# Patient Record
Sex: MALE | Race: WHITE | NOT HISPANIC OR LATINO | Employment: FULL TIME | URBAN - METROPOLITAN AREA
[De-identification: names, ages, dates, MRNs, and addresses within clinical notes are randomized per-mention and may not be internally consistent; named-entity substitution may affect disease eponyms.]

---

## 2019-07-23 ENCOUNTER — HOSPITAL ENCOUNTER (EMERGENCY)
Facility: HOSPITAL | Age: 34
Discharge: HOME | End: 2019-07-24
Attending: EMERGENCY MEDICINE

## 2019-07-23 VITALS
OXYGEN SATURATION: 100 % | SYSTOLIC BLOOD PRESSURE: 171 MMHG | RESPIRATION RATE: 18 BRPM | DIASTOLIC BLOOD PRESSURE: 104 MMHG | HEART RATE: 72 BPM | TEMPERATURE: 97.3 F

## 2019-07-23 DIAGNOSIS — K04.7 DENTAL ABSCESS: Primary | ICD-10-CM

## 2019-07-23 PROCEDURE — 99282 EMERGENCY DEPT VISIT SF MDM: CPT

## 2019-07-24 RX ORDER — OXYCODONE HYDROCHLORIDE AND ACETAMINOPHEN 5; 325 MG/1; MG/1
TABLET ORAL
Qty: 10 TABLET | Refills: 0 | Status: SHIPPED | OUTPATIENT
Start: 2019-07-24

## 2019-07-24 RX ORDER — CLINDAMYCIN HYDROCHLORIDE 300 MG/1
300 CAPSULE ORAL EVERY 8 HOURS SCHEDULED
Qty: 21 CAPSULE | Refills: 0 | Status: SHIPPED | OUTPATIENT
Start: 2019-07-24 | End: 2019-07-31

## 2019-07-24 RX ORDER — BUPIVACAINE HYDROCHLORIDE 5 MG/ML
5 INJECTION, SOLUTION EPIDURAL; INTRACAUDAL ONCE
Status: COMPLETED | OUTPATIENT
Start: 2019-07-24 | End: 2019-07-24

## 2019-07-24 RX ORDER — OXYCODONE HYDROCHLORIDE AND ACETAMINOPHEN 5; 325 MG/1; MG/1
2 TABLET ORAL ONCE
Status: COMPLETED | OUTPATIENT
Start: 2019-07-24 | End: 2019-07-24

## 2019-07-24 RX ORDER — CLINDAMYCIN HYDROCHLORIDE 150 MG/1
300 CAPSULE ORAL ONCE
Status: COMPLETED | OUTPATIENT
Start: 2019-07-24 | End: 2019-07-24

## 2019-07-24 RX ADMIN — CLINDAMYCIN HYDROCHLORIDE 300 MG: 150 CAPSULE ORAL at 00:30

## 2019-07-24 RX ADMIN — OXYCODONE HYDROCHLORIDE AND ACETAMINOPHEN 2 TABLET: 5; 325 TABLET ORAL at 00:30

## 2019-07-24 RX ADMIN — BUPIVACAINE HYDROCHLORIDE 5 ML: 5 INJECTION, SOLUTION EPIDURAL; INTRACAUDAL; PERINEURAL at 00:12

## 2019-07-24 NOTE — ED PROVIDER NOTES
History  Chief Complaint   Patient presents with    Dental Pain     Patient states he was seen at [facility] for broken tooth, given antibiotics and referred to a clinic  Patient states he has not been able to get into clinic, went to [facility] and waited for an hour in the waiting room so came here instead  Patients insurance does not start until next week so he cannot get into dentist      34 yo male broke left lower tooth a few months ago  Initially ok but then pain worse so went to [facility] 3 weeks ago and given course of Keflex  He was given number for clinic to follow up with and he says he called multiple times but they never called him back  He was trying to wait until insurance kicked in next week but today pain really severe  Now with associated left facial swelling  No fever  No relief with Advil  None       History reviewed  No pertinent past medical history  History reviewed  No pertinent surgical history  History reviewed  No pertinent family history  I have reviewed and agree with the history as documented  Social History     Tobacco Use    Smoking status: Never Smoker   Substance Use Topics    Alcohol use: Yes    Drug use: Not Currently        Review of Systems    Physical Exam  Physical Exam   Constitutional: He is oriented to person, place, and time  He appears well-developed and well-nourished  He appears distressed  HENT:   Left lower molars broken and decayed with gum swelling and periapical abscess  + mild left cheek swelling  Eyes: No scleral icterus  Neck: Neck supple  Pulmonary/Chest: Effort normal    Musculoskeletal: Normal range of motion  Neurological: He is alert and oriented to person, place, and time  Skin: Skin is warm and dry  He is not diaphoretic  Psychiatric: He has a normal mood and affect  His behavior is normal    Nursing note and vitals reviewed        Vital Signs  ED Triage Vitals [07/23/19 2340]   Temperature Pulse Respirations Blood Pressure SpO2   (!) 97 3 °F (36 3 °C) 72 18 (!) 171/104 100 %      Temp Source Heart Rate Source Patient Position - Orthostatic VS BP Location FiO2 (%)   Tympanic Monitor -- Right arm --      Pain Score       --           Vitals:    07/23/19 2340   BP: (!) 171/104   Pulse: 72         Visual Acuity      ED Medications  Medications   bupivacaine (PF) (MARCAINE) 0 5 % injection 5 mL (5 mL Injection Given 7/24/19 0012)   oxyCODONE-acetaminophen (PERCOCET) 5-325 mg per tablet 2 tablet (2 tablets Oral Given 7/24/19 0030)   clindamycin (CLEOCIN) capsule 300 mg (300 mg Oral Given 7/24/19 0030)       Diagnostic Studies  Results Reviewed     None                 No orders to display              Procedures  Procedures       ED Course                               MDM  Number of Diagnoses or Management Options  Diagnosis management comments: I advised abx, warm salt water rinses, and will give pain pills  Advised he needs to see dentist asap  He really wants a dental block so other ER doctor will do it since I don't do that procedure  Disposition  Final diagnoses:   Dental abscess     Time reflects when diagnosis was documented in both MDM as applicable and the Disposition within this note     Time User Action Codes Description Comment    0/20/5557 85:23 AM Qamar Hairston Add [Z70 3] Dental abscess       ED Disposition     ED Disposition Condition Date/Time Comment    Discharge Stable Wed Jul 24, 2019 12:14 AM Adonay Pandey discharge to home/self care              Follow-up Information     Follow up With Specialties Details Why Contact Info    any dentist or oral surgeon   as soon as possible           Discharge Medication List as of 7/24/2019 12:17 AM      START taking these medications    Details   clindamycin (CLEOCIN) 300 MG capsule Take 1 capsule (300 mg total) by mouth every 8 (eight) hours for 7 days, Starting Wed 7/24/2019, Until Wed 7/31/2019, Print      oxyCODONE-acetaminophen (PERCOCET) 5-325 mg per tablet 1-2 po every 6 hours prn severe pain, Print           No discharge procedures on file      ED Provider  Electronically Signed by           Nell Linn MD  43/47/29 9798

## 2019-07-24 NOTE — ED PROCEDURE NOTE
PROCEDURE  Nerve block  Date/Time: 7/24/2019 12:36 AM  Performed by: Juan C Gonzalez MD  Authorized by: Juan C Gonzalez MD     Patient location:  ED  Consent:     Consent obtained:  Verbal    Consent given by:  Patient    Risks discussed: Allergic reaction, bleeding, infection, pain, nerve damage, swelling, unsuccessful block and intravenous injection    Alternatives discussed:  No treatment, delayed treatment, alternative treatment and referral  Universal protocol:     Procedure explained and questions answered to patient or proxy's satisfaction: yes      Patient identity confirmed:  Verbally with patient  Indications:     Indications:  Pain relief  Location:     Body area:  Head (dental)    Head nerve blocked: Dental block left lower molar  Nerve type:  Peripheral    Laterality:  Left  Procedure details (see MAR for exact dosages): Block needle gauge:  25 G    Anesthetic injected:  Bupivacaine 0 25% w/o epi    Steroid injected:  None    Additive injected:  None    Injection procedure:  Anatomic landmarks palpated, introduced needle, negative aspiration for blood and incremental injection  Post-procedure details:     Dressing:  None    Outcome:  Pain relieved    Patient tolerance of procedure:   Tolerated well, no immediate complications         Juan C Gonzalez MD  07/24/19 0469

## 2020-09-18 ENCOUNTER — HOSPITAL ENCOUNTER (EMERGENCY)
Facility: HOSPITAL | Age: 35
Discharge: HOME | End: 2020-09-18

## 2020-09-18 VITALS
RESPIRATION RATE: 18 BRPM | HEART RATE: 99 BPM | OXYGEN SATURATION: 99 % | DIASTOLIC BLOOD PRESSURE: 94 MMHG | TEMPERATURE: 99 F | WEIGHT: 200 LBS | SYSTOLIC BLOOD PRESSURE: 149 MMHG

## 2020-09-18 DIAGNOSIS — I10 HYPERTENSION, UNSPECIFIED TYPE: Primary | ICD-10-CM

## 2020-09-18 PROCEDURE — 99283 EMERGENCY DEPT VISIT LOW MDM: CPT

## 2020-09-18 PROCEDURE — 99282 EMERGENCY DEPT VISIT SF MDM: CPT

## 2020-09-18 NOTE — ED PROVIDER NOTES
History  Chief Complaint   Patient presents with    Evaluation of Abnormal Diagnostic Test     Sent from central booking for high BP and tachycardia  24-year-old male here secondary to having elevated blood pressure on his medical intake at the local skilled nursing  Patient was picked up tonight on an outstanding warrant and brought to the skilled nursing  Patient has no active symptoms denies taking any meds for his blood pressure patient denies any chest pain shortness of breath dizziness headache or neurological symptoms blood pressure here is 149/94          Prior to Admission Medications   Prescriptions Last Dose Informant Patient Reported? Taking?   oxyCODONE-acetaminophen (PERCOCET) 5-325 mg per tablet Not Taking at Unknown time  No No   Si-2 po every 6 hours prn severe pain   Patient not taking: Reported on 2020      Facility-Administered Medications: None       History reviewed  No pertinent past medical history  History reviewed  No pertinent surgical history  History reviewed  No pertinent family history  I have reviewed and agree with the history as documented  E-Cigarette/Vaping    E-Cigarette Use Never User      E-Cigarette/Vaping Substances     Social History     Tobacco Use    Smoking status: Current Every Day Smoker     Packs/day: 0 50     Types: Cigarettes    Smokeless tobacco: Never Used   Substance Use Topics    Alcohol use: Yes     Frequency: Monthly or less    Drug use: Yes     Types: Marijuana       Review of Systems   Constitutional: Negative for chills and fever  HENT: Negative for congestion  Eyes: Negative for visual disturbance  Respiratory: Negative for shortness of breath  Cardiovascular: Negative for chest pain  Gastrointestinal: Negative for abdominal pain  Endocrine: Negative for cold intolerance  Genitourinary: Negative for frequency  Musculoskeletal: Negative for gait problem  Skin: Negative for rash  Neurological: Negative for dizziness  Psychiatric/Behavioral: Negative for behavioral problems and confusion  Physical Exam  Physical Exam  Vitals signs and nursing note reviewed  Constitutional:       Appearance: He is well-developed  HENT:      Head: Normocephalic and atraumatic  Eyes:      Conjunctiva/sclera: Conjunctivae normal       Pupils: Pupils are equal, round, and reactive to light  Neck:      Musculoskeletal: Normal range of motion and neck supple  Cardiovascular:      Rate and Rhythm: Normal rate and regular rhythm  Heart sounds: Normal heart sounds  Pulmonary:      Effort: Pulmonary effort is normal       Breath sounds: Normal breath sounds  Abdominal:      General: Bowel sounds are normal       Palpations: Abdomen is soft  Musculoskeletal: Normal range of motion  Skin:     General: Skin is warm and dry  Capillary Refill: Capillary refill takes less than 2 seconds  Neurological:      Mental Status: He is alert and oriented to person, place, and time  Psychiatric:         Behavior: Behavior normal          Vital Signs  ED Triage Vitals [09/18/20 0113]   Temperature Pulse Respirations Blood Pressure SpO2   99 °F (37 2 °C) 99 18 149/94 99 %      Temp Source Heart Rate Source Patient Position - Orthostatic VS BP Location FiO2 (%)   Tympanic Monitor Lying Left arm --      Pain Score       Worst Possible Pain           Vitals:    09/18/20 0113   BP: 149/94   Pulse: 99   Patient Position - Orthostatic VS: Lying         Visual Acuity      ED Medications  Medications - No data to display    Diagnostic Studies  Results Reviewed     None                 No orders to display              Procedures  Procedures         ED Course                                       MDM  Number of Diagnoses or Management Options  Diagnosis management comments: Patient is medically cleared for incarceration he has a blood pressure which is borderline in nature but does not require any medical intervention        Disposition  Final diagnoses:   Hypertension, unspecified type     Time reflects when diagnosis was documented in both MDM as applicable and the Disposition within this note     Time User Action Codes Description Comment    9/18/2020  1:20 AM Blayne Ames Add [I10] Hypertension, unspecified type       ED Disposition     ED Disposition Condition Date/Time Comment    Discharge Stable Fri Sep 18, 2020  1:20 AM Phillip Gonzalez discharge to home/self care  Follow-up Information     Follow up With Specialties Details Why Contact Info    Your physician    in next week  Patient's Medications   Discharge Prescriptions    No medications on file     No discharge procedures on file      PDMP Review     None          ED Provider  Electronically Signed by           Gianfranco Barnett MD  09/18/20 7788

## 2020-11-05 ENCOUNTER — HOSPITAL ENCOUNTER (EMERGENCY)
Facility: HOSPITAL | Age: 35
End: 2020-11-05
Attending: EMERGENCY MEDICINE | Admitting: EMERGENCY MEDICINE
Payer: OTHER GOVERNMENT

## 2020-11-05 ENCOUNTER — APPOINTMENT (EMERGENCY)
Dept: RADIOLOGY | Facility: HOSPITAL | Age: 35
End: 2020-11-05
Payer: OTHER GOVERNMENT

## 2020-11-05 VITALS
RESPIRATION RATE: 18 BRPM | DIASTOLIC BLOOD PRESSURE: 95 MMHG | HEART RATE: 117 BPM | WEIGHT: 180 LBS | TEMPERATURE: 98.6 F | SYSTOLIC BLOOD PRESSURE: 151 MMHG | OXYGEN SATURATION: 99 %

## 2020-11-05 DIAGNOSIS — R20.8 DECREASED SENSATION OF HAND AND ARM: ICD-10-CM

## 2020-11-05 DIAGNOSIS — S63.256A CLOSED DISLOCATION OF RIGHT LITTLE FINGER: Primary | ICD-10-CM

## 2020-11-05 DIAGNOSIS — S69.81XA HYPEREXTENSION INJURY OF FINGER OF RIGHT HAND: ICD-10-CM

## 2020-11-05 PROCEDURE — 96372 THER/PROPH/DIAG INJ SC/IM: CPT

## 2020-11-05 PROCEDURE — 99285 EMERGENCY DEPT VISIT HI MDM: CPT | Performed by: EMERGENCY MEDICINE

## 2020-11-05 PROCEDURE — 29130 APPL FINGER SPLINT STATIC: CPT | Performed by: EMERGENCY MEDICINE

## 2020-11-05 PROCEDURE — 73130 X-RAY EXAM OF HAND: CPT

## 2020-11-05 PROCEDURE — 99283 EMERGENCY DEPT VISIT LOW MDM: CPT

## 2020-11-05 RX ORDER — KETOROLAC TROMETHAMINE 30 MG/ML
30 INJECTION, SOLUTION INTRAMUSCULAR; INTRAVENOUS ONCE
Status: COMPLETED | OUTPATIENT
Start: 2020-11-05 | End: 2020-11-05

## 2020-11-05 RX ORDER — IBUPROFEN 800 MG/1
800 TABLET ORAL EVERY 8 HOURS PRN
Qty: 15 TABLET | Refills: 0 | Status: SHIPPED | OUTPATIENT
Start: 2020-11-05 | End: 2020-11-15

## 2020-11-05 RX ADMIN — KETOROLAC TROMETHAMINE 30 MG: 30 INJECTION, SOLUTION INTRAMUSCULAR at 17:50

## 2021-05-29 ENCOUNTER — HOSPITAL ENCOUNTER (EMERGENCY)
Facility: HOSPITAL | Age: 36
Discharge: HOME | End: 2021-05-29
Attending: EMERGENCY MEDICINE | Admitting: EMERGENCY MEDICINE
Payer: OTHER GOVERNMENT

## 2021-05-29 VITALS
TEMPERATURE: 98 F | DIASTOLIC BLOOD PRESSURE: 85 MMHG | HEIGHT: 68 IN | HEART RATE: 85 BPM | RESPIRATION RATE: 18 BRPM | OXYGEN SATURATION: 95 % | SYSTOLIC BLOOD PRESSURE: 137 MMHG | WEIGHT: 215.39 LBS | BODY MASS INDEX: 32.64 KG/M2

## 2021-05-29 DIAGNOSIS — H10.9 CONJUNCTIVITIS: Primary | ICD-10-CM

## 2021-05-29 PROCEDURE — 99284 EMERGENCY DEPT VISIT MOD MDM: CPT | Performed by: EMERGENCY MEDICINE

## 2021-05-29 PROCEDURE — 99283 EMERGENCY DEPT VISIT LOW MDM: CPT

## 2021-05-29 RX ORDER — TOBRAMYCIN 3 MG/ML
1 SOLUTION/ DROPS OPHTHALMIC
Status: DISCONTINUED | OUTPATIENT
Start: 2021-05-29 | End: 2021-05-30 | Stop reason: HOSPADM

## 2021-05-29 RX ORDER — TETRACAINE HYDROCHLORIDE 5 MG/ML
1 SOLUTION OPHTHALMIC ONCE
Status: COMPLETED | OUTPATIENT
Start: 2021-05-29 | End: 2021-05-29

## 2021-05-29 RX ADMIN — FLUORESCEIN SODIUM 1 STRIP: 1 STRIP OPHTHALMIC at 22:12

## 2021-05-29 RX ADMIN — TETRACAINE HYDROCHLORIDE 1 DROP: 5 SOLUTION OPHTHALMIC at 22:12

## 2021-05-29 RX ADMIN — TOBRAMYCIN 1 DROP: 3 SOLUTION/ DROPS OPHTHALMIC at 22:11

## 2021-05-30 NOTE — ED PROVIDER NOTES
History  Chief Complaint   Patient presents with    Eye Problem     Patient reports ongoing erythema of both eyes, pain and vision changes of right eye since January  Reports waking up with eyes "crusted shut"  Also describes floaters, light sensitivity, headaches  Per patient, father recently diagnosed with retinal detachment  28 YR MALE WITH INTERMITETNT REDNESS TO BOTH EYES SINCE - HAS NOT WORN CONTACTS FOR LAST 2 MONTHS- C O WORSENING OF R EYE- WITH CRUSTED SHUT IN AM RECENTLY -- C/O MILD SUPRAORBITAL PAIN   INTERMITENTLY OVER LAST 2 WEEKS WITH SEEING BLACK SPOTS IN R EYE- NO VISUAL FIELD CUT- NO AMAUROSIS - NO URI/COUGH- NO EAR COMPS- SORE THROAT- NO FACIAL RASH --       History provided by:  Patient   used: No    Eye Problem  Location:  Both eyes  Associated symptoms: discharge and redness    Associated symptoms: no itching and no photophobia        Prior to Admission Medications   Prescriptions Last Dose Informant Patient Reported? Taking?   ibuprofen (MOTRIN) 800 mg tablet   No No   Sig: Take 1 tablet (800 mg total) by mouth every 8 (eight) hours as needed for mild pain or moderate pain for up to 10 days   oxyCODONE-acetaminophen (PERCOCET) 5-325 mg per tablet   No No   Si-2 po every 6 hours prn severe pain   Patient not taking: Reported on 2020      Facility-Administered Medications: None       History reviewed  No pertinent past medical history  Past Surgical History:   Procedure Laterality Date    ABDOMINAL SURGERY         History reviewed  No pertinent family history  I have reviewed and agree with the history as documented      E-Cigarette/Vaping    E-Cigarette Use Never User      E-Cigarette/Vaping Substances     Social History     Tobacco Use    Smoking status: Current Every Day Smoker     Packs/day: 0 50     Types: Cigarettes    Smokeless tobacco: Never Used   Substance Use Topics    Alcohol use: Not Currently     Frequency: Monthly or less    Drug use: Not Currently     Types: Marijuana       Review of Systems   Constitutional: Negative  HENT: Negative  Eyes: Positive for pain, discharge, redness and visual disturbance  Negative for photophobia and itching  Respiratory: Negative  Cardiovascular: Negative  Gastrointestinal: Negative  Endocrine: Negative  Genitourinary: Negative  Musculoskeletal: Negative  Skin: Negative  Allergic/Immunologic: Negative  Neurological: Negative  Hematological: Negative  Psychiatric/Behavioral: Negative  Physical Exam  Physical Exam  Vitals signs and nursing note reviewed  Constitutional:       General: He is not in acute distress  Appearance: Normal appearance  He is not ill-appearing, toxic-appearing or diaphoretic  Comments: AVSS-- PULSE OX 95 % ON RA- INTERPRETATION IS NORMAL- NO INTERVENTION -    HENT:      Head: Normocephalic and atraumatic  Right Ear: Ear canal and external ear normal  There is impacted cerumen  Left Ear: Ear canal and external ear normal  There is impacted cerumen  Nose: Nose normal  No congestion or rhinorrhea  Mouth/Throat:      Mouth: Mucous membranes are moist       Pharynx: Oropharynx is clear  No oropharyngeal exudate or posterior oropharyngeal erythema  Eyes:      General: No scleral icterus  Right eye: Discharge present  Left eye: No discharge  Extraocular Movements: Extraocular movements intact  Pupils: Pupils are equal, round, and reactive to light  Comments: BILATERAL BULBAR PALPEBRAL INJECTION    Neck:      Musculoskeletal: Normal range of motion and neck supple  No neck rigidity or muscular tenderness  Vascular: No carotid bruit  Comments: NO PMT C/T/L/S SPINE   Cardiovascular:      Rate and Rhythm: Normal rate and regular rhythm  Pulses: Normal pulses  Heart sounds: Normal heart sounds  No murmur  No friction rub  No gallop      Pulmonary:      Effort: Pulmonary effort is normal  No respiratory distress  Breath sounds: Normal breath sounds  No stridor  No wheezing, rhonchi or rales  Chest:      Chest wall: No tenderness  Abdominal:      General: Bowel sounds are normal  There is no distension  Palpations: Abdomen is soft  There is no mass  Tenderness: There is no abdominal tenderness  There is no right CVA tenderness, left CVA tenderness, guarding or rebound  Hernia: No hernia is present  Musculoskeletal: Normal range of motion  General: No swelling, tenderness, deformity or signs of injury  Right lower leg: No edema  Left lower leg: No edema  Lymphadenopathy:      Cervical: No cervical adenopathy  Skin:     General: Skin is warm  Capillary Refill: Capillary refill takes less than 2 seconds  Coloration: Skin is not jaundiced or pale  Findings: No bruising, erythema, lesion or rash  Neurological:      General: No focal deficit present  Mental Status: He is alert and oriented to person, place, and time  Mental status is at baseline  Cranial Nerves: No cranial nerve deficit  Sensory: No sensory deficit  Motor: No weakness  Coordination: Coordination normal       Gait: Gait normal       Comments: NORMAL NON FOCAL NEURO EXAM    Psychiatric:         Mood and Affect: Mood normal          Thought Content:  Thought content normal          Judgment: Judgment normal          Vital Signs  ED Triage Vitals   Temperature Pulse Respirations Blood Pressure SpO2   05/29/21 2054 05/29/21 2048 05/29/21 2048 05/29/21 2048 05/29/21 2048   98 °F (36 7 °C) 85 18 137/85 95 %      Temp Source Heart Rate Source Patient Position - Orthostatic VS BP Location FiO2 (%)   05/29/21 2054 05/29/21 2048 05/29/21 2048 05/29/21 2048 --   Oral Monitor Lying Right arm       Pain Score       --                  Vitals:    05/29/21 2048   BP: 137/85   Pulse: 85   Patient Position - Orthostatic VS: Lying         Visual Acuity  Visual Acuity      Most Recent Value   Visual acuity R eye is  20/200   Visual acuity Left eye is  20/200   Visual acuity in both eyes is  20/200   Unable to obtain visual acuity due to  patient does not have his contacts in    Wearing corrective eyewear/lenses? No          ED Medications  Medications   tobramycin (TOBREX) 0 3 % ophthalmic solution 1 drop (1 drop Both Eyes Given 5/29/21 2211)   tetracaine 0 5 % ophthalmic solution 1 drop (1 drop Both Eyes Given 5/29/21 2212)   fluorescein sodium sterile ophthalmic strip 1 strip (1 strip Both Eyes Given 5/29/21 2212)   fluorescein sodium sterile 1 strip ophthalmic strip **ADS Override Pull** (1 strip  Given 5/29/21 2212)       Diagnostic Studies  Results Reviewed     None                 No orders to display              Procedures  Procedures         ED Course  ED Course as of May 29 2332   Sat May 29, 2021   2203 ER MD NOTE- R IOP BY [facility] TONOPEN 19-- L  IOP BY [facility] TONOPEN 12      2204 ERV MD NOTE- BILATERAL BLUE LIGHT EXAM-- NO DYE UPTAKE--       2205 ER MD NOTE VISUAL ACUITY NOTED BY ER MD                                              MDM    Disposition  Final diagnoses:   Conjunctivitis     Time reflects when diagnosis was documented in both MDM as applicable and the Disposition within this note     Time User Action Codes Description Comment    5/29/2021 10:35 PM Tammy Johan Add [H10 9] Conjunctivitis       ED Disposition     ED Disposition Condition Date/Time Comment    Discharge Stable Sat May 29, 2021 10:35 PM Parris Hernandez discharge to home/self care              Follow-up Information     Follow up With Specialties Details Why [facility],  Ophthalmology Call in 2 days  [address] [facility]            Discharge Medication List as of 5/29/2021 10:40 PM      CONTINUE these medications which have NOT CHANGED    Details   ibuprofen (MOTRIN) 800 mg tablet Take 1 tablet (800 mg total) by mouth every 8 (eight) hours as needed for mild pain or moderate pain for up to 10 days, Starting Thu 11/5/2020, Until Sun 11/15/2020, Print      oxyCODONE-acetaminophen (PERCOCET) 5-325 mg per tablet 1-2 po every 6 hours prn severe pain, Print           No discharge procedures on file      PDMP Review     None          ED Provider  Electronically Signed by           Scott Medina MD  05/29/21 2055

## 2021-05-30 NOTE — DISCHARGE INSTRUCTIONS
DIAGNOSIS: BILATERAL CONJUNCTIVITIS - RIGHT GREATER THAN  LEFT     - PLEASE DO NOT USE CONTACTS - UNTIL SEEN BY OPHTHALMOLOGY     - TOBRAMYCIN EYE DROPS  1 DROP TO BOTH EYES 4 TIMES PER DAY UNTIL SEEN BY OPHTHALMOLOGY    - IT IS VERY IMPORTANT THAT YOU SEE  THE OPHTHALMOLOGIST - SINCE THIS HAS BEEN ONGOING FOR 4 MONTHS- PLEASE CALL THE OPHTHALMOLOGIST  ON Monday TO SCHEDULE AN APPOINTMENT     - PLEASE RETURN TO THE ER FOR ANY DECREASING VISION IN EYES OR ANY INCREASING EYE PAIN

## 2023-06-04 ENCOUNTER — HOSPITAL ENCOUNTER (EMERGENCY)
Facility: HOSPITAL | Age: 38
Discharge: HOME | End: 2023-06-04
Attending: EMERGENCY MEDICINE
Payer: COMMERCIAL

## 2023-06-04 VITALS
RESPIRATION RATE: 20 BRPM | DIASTOLIC BLOOD PRESSURE: 71 MMHG | SYSTOLIC BLOOD PRESSURE: 147 MMHG | TEMPERATURE: 98.3 F | HEART RATE: 90 BPM | OXYGEN SATURATION: 97 %

## 2023-06-04 DIAGNOSIS — H11.31 NON-TRAUMATIC SUBCONJUNCTIVAL HEMORRHAGE, RIGHT: Primary | ICD-10-CM

## 2023-06-04 PROCEDURE — 99282 EMERGENCY DEPT VISIT SF MDM: CPT

## 2023-06-05 NOTE — DISCHARGE INSTRUCTIONS
Diagnosis; right eye subconjunctival hemorrhage- bleeding     Usually this will resolve on its own over next 2-3 weeks    -  please return to  the er for any right eye pain or decreasing vision

## 2023-06-07 NOTE — ED PROVIDER NOTES
History  Chief Complaint   Patient presents with   • Eye Redness     Patient reports noticing eye redness today  Right eye appears blood shot  Denies any injury to eye  States eye is itchy, denies pain  40 YR  MALE TODAY NOTICED REDNESS OF R EYE-- NO R EYE INJURY -  NO RECENT COUGH  /VALSALVA MANEUVER - NO EYE PAIN/ CHANGE IN VISION - NO BLEEDING/ BRUISING ANYWHERE ELSE       History provided by:  Patient   used: No        Prior to Admission Medications   Prescriptions Last Dose Informant Patient Reported? Taking?   ibuprofen (MOTRIN) 800 mg tablet   No No   Sig: Take 1 tablet (800 mg total) by mouth every 8 (eight) hours as needed for mild pain or moderate pain for up to 10 days   oxyCODONE-acetaminophen (PERCOCET) 5-325 mg per tablet   No No   Si-2 po every 6 hours prn severe pain   Patient not taking: Reported on 2020      Facility-Administered Medications: None       History reviewed  No pertinent past medical history  Past Surgical History:   Procedure Laterality Date   • ABDOMINAL SURGERY     • HERNIA REPAIR         History reviewed  No pertinent family history  I have reviewed and agree with the history as documented  E-Cigarette/Vaping   • E-Cigarette Use Never User      E-Cigarette/Vaping Substances     Social History     Tobacco Use   • Smoking status: Every Day     Packs/day: 0 50     Types: Cigarettes   • Smokeless tobacco: Never   Vaping Use   • Vaping Use: Never used   Substance Use Topics   • Alcohol use: Not Currently   • Drug use: Yes     Types: Marijuana       Review of Systems   Constitutional: Negative  HENT: Negative  Eyes: Positive for redness  Negative for photophobia, pain, discharge, itching and visual disturbance  Respiratory: Negative  Cardiovascular: Negative  Gastrointestinal: Negative  Endocrine: Negative  Genitourinary: Negative  Musculoskeletal: Negative  Skin: Negative  Allergic/Immunologic: Negative  Neurological: Negative  Hematological: Negative  Psychiatric/Behavioral: Negative  Physical Exam  Physical Exam  Vitals and nursing note reviewed  Constitutional:       General: He is not in acute distress  Appearance: Normal appearance  He is not ill-appearing, toxic-appearing or diaphoretic  Comments: AVSS-  PULSE OX 97 % ON RA- INTERPRETATION IS NORMAL- NO INTERVENTION    HENT:      Head: Normocephalic and atraumatic  Right Ear: Tympanic membrane, ear canal and external ear normal  There is no impacted cerumen  Left Ear: Tympanic membrane, ear canal and external ear normal  There is no impacted cerumen  Nose: Nose normal  No congestion or rhinorrhea  Mouth/Throat:      Mouth: Mucous membranes are moist       Pharynx: Oropharynx is clear  No oropharyngeal exudate or posterior oropharyngeal erythema  Eyes:      General: No scleral icterus  Right eye: No discharge  Left eye: No discharge  Extraocular Movements: Extraocular movements intact  Conjunctiva/sclera: Conjunctivae normal       Pupils: Pupils are equal, round, and reactive to light  Comments: R UPPER ALTERAL SC HEMORRHAGE-    Neck:      Vascular: No carotid bruit  Comments: NO PMT C/T/L/S SPINE   Cardiovascular:      Rate and Rhythm: Normal rate and regular rhythm  Pulses: Normal pulses  Heart sounds: Normal heart sounds  No murmur heard  No friction rub  No gallop  Pulmonary:      Effort: Pulmonary effort is normal  No respiratory distress  Breath sounds: Normal breath sounds  No stridor  No wheezing, rhonchi or rales  Chest:      Chest wall: No tenderness  Abdominal:      General: Bowel sounds are normal  There is no distension  Palpations: Abdomen is soft  There is no mass  Tenderness: There is no abdominal tenderness  There is no right CVA tenderness, left CVA tenderness, guarding or rebound  Hernia: No hernia is present  Musculoskeletal:         General: No swelling, tenderness, deformity or signs of injury  Normal range of motion  Cervical back: Normal range of motion and neck supple  No rigidity or tenderness  Right lower leg: No edema  Left lower leg: No edema  Lymphadenopathy:      Cervical: No cervical adenopathy  Skin:     General: Skin is warm  Capillary Refill: Capillary refill takes less than 2 seconds  Coloration: Skin is not jaundiced or pale  Findings: No bruising, erythema, lesion or rash  Neurological:      General: No focal deficit present  Mental Status: He is alert and oriented to person, place, and time  Mental status is at baseline  Cranial Nerves: No cranial nerve deficit  Sensory: No sensory deficit  Motor: No weakness  Coordination: Coordination normal       Gait: Gait normal       Comments: NORMAL NON FOCAL NEURO EXAM    Psychiatric:         Mood and Affect: Mood normal          Behavior: Behavior normal          Thought Content: Thought content normal          Judgment: Judgment normal          Vital Signs  ED Triage Vitals [06/04/23 1914]   Temperature Pulse Respirations Blood Pressure SpO2   98 3 °F (36 8 °C) 90 20 147/71 97 %      Temp Source Heart Rate Source Patient Position - Orthostatic VS BP Location FiO2 (%)   Oral Monitor Sitting Right arm --      Pain Score       --           Vitals:    06/04/23 1914   BP: 147/71   Pulse: 90   Patient Position - Orthostatic VS: Sitting         Visual Acuity      ED Medications  Medications - No data to display    Diagnostic Studies  Results Reviewed     None                 No orders to display              Procedures  Procedures         ED Course                               SBIRT 22yo+    Flowsheet Row Most Recent Value   Initial Alcohol Screen: US AUDIT-C     1  How often do you have a drink containing alcohol? 0 Filed at: 06/04/2023 1917   2   How many drinks containing alcohol do you have on a typical day you are drinking? 0 Filed at: 06/04/2023 1917   3a  Male UNDER 65: How often do you have five or more drinks on one occasion? 0 Filed at: 06/04/2023 1917   3b  FEMALE Any Age, or MALE 65+: How often do you have 4 or more drinks on one occassion? 0 Filed at: 06/04/2023 1917   Audit-C Score 0 Filed at: 06/04/2023 3127   ABIOLA: How many times in the past year have you    Used an illegal drug or used a prescription medication for non-medical reasons? Never Filed at: 06/04/2023 1917                    Medical Decision Making  AFTER H AND P - ER MD HIGHLY DOUBTS ANY SEVERE / VISION THREATENING CAUSE OF EYE REDNESS- NO ACUTE GLAUCOMA-      Non-traumatic subconjunctival hemorrhage, right: acute illness or injury     Details: SEE ABOVE  Amount and/or Complexity of Data Reviewed  Discussion of management or test interpretation with external provider(s): MILD AMOUNT OF ER MD THOUGHT COMPLEXITY AND WORKUP     Risk  Decision regarding hospitalization  Disposition  Final diagnoses:   Non-traumatic subconjunctival hemorrhage, right     Time reflects when diagnosis was documented in both MDM as applicable and the Disposition within this note     Time User Action Codes Description Comment    6/4/2023  8:09 PM Jose Melissa Add [H11 [id number]] Non-traumatic subconjunctival hemorrhage, right       ED Disposition     ED Disposition   Discharge    Condition   Stable    Date/Time   Sun Jun 4, 2023 2008    [address]  discharge to home/self care                 Follow-up Information     Follow up With Specialties Details Why Contact Info    Joyce Rinaldi MD Ophthalmology Call  As needed Arlin Cain [address] [place] [address]  [phone]            Discharge Medication List as of 6/4/2023  8:10 PM      CONTINUE these medications which have NOT CHANGED    Details   ibuprofen (MOTRIN) 800 mg tablet Take 1 tablet (800 mg total) by mouth every 8 (eight) hours as needed for mild pain or moderate pain for up to 10 days, Starting Thu 11/5/2020, Until Sun 11/15/2020, Print      oxyCODONE-acetaminophen (PERCOCET) 5-325 mg per tablet 1-2 po every 6 hours prn severe pain, Print             No discharge procedures on file      PDMP Review     None          ED Provider  Electronically Signed by           Marissa Sarmiento MD  06/07/23 4839